# Patient Record
Sex: MALE | Race: WHITE | NOT HISPANIC OR LATINO | URBAN - METROPOLITAN AREA
[De-identification: names, ages, dates, MRNs, and addresses within clinical notes are randomized per-mention and may not be internally consistent; named-entity substitution may affect disease eponyms.]

---

## 2023-01-27 ENCOUNTER — EMERGENCY (EMERGENCY)
Facility: HOSPITAL | Age: 48
LOS: 0 days | Discharge: HOME | End: 2023-01-28
Attending: STUDENT IN AN ORGANIZED HEALTH CARE EDUCATION/TRAINING PROGRAM
Payer: OTHER MISCELLANEOUS

## 2023-01-27 VITALS
DIASTOLIC BLOOD PRESSURE: 114 MMHG | OXYGEN SATURATION: 99 % | HEART RATE: 101 BPM | RESPIRATION RATE: 18 BRPM | TEMPERATURE: 98 F | SYSTOLIC BLOOD PRESSURE: 182 MMHG

## 2023-01-27 DIAGNOSIS — R07.81 PLEURODYNIA: ICD-10-CM

## 2023-01-27 DIAGNOSIS — R21 RASH AND OTHER NONSPECIFIC SKIN ERUPTION: ICD-10-CM
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
## 2023-01-27 DIAGNOSIS — R06.2 WHEEZING: ICD-10-CM

## 2023-01-27 DIAGNOSIS — S22.41XA MULTIPLE FRACTURES OF RIBS, RIGHT SIDE, INITIAL ENCOUNTER FOR CLOSED FRACTURE: ICD-10-CM

## 2023-01-27 DIAGNOSIS — T44.7X5A ADVERSE EFFECT OF BETA-ADRENORECEPTOR ANTAGONISTS, INITIAL ENCOUNTER: ICD-10-CM
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
## 2023-01-27 PROCEDURE — 99284 EMERGENCY DEPT VISIT MOD MDM: CPT

## 2023-01-27 PROCEDURE — 93010 ELECTROCARDIOGRAM REPORT: CPT

## 2023-01-27 RX ORDER — DIPHENHYDRAMINE HCL 50 MG
50 CAPSULE ORAL ONCE
Refills: 0 | Status: DISCONTINUED | OUTPATIENT
Start: 2023-01-27 | End: 2023-01-28

## 2023-01-27 RX ORDER — FAMOTIDINE 10 MG/ML
20 INJECTION INTRAVENOUS ONCE
Refills: 0 | Status: COMPLETED | OUTPATIENT
Start: 2023-01-27 | End: 2023-01-27

## 2023-01-27 RX ORDER — EPINEPHRINE 0.3 MG/.3ML
0.3 INJECTION INTRAMUSCULAR; SUBCUTANEOUS ONCE
Refills: 0 | Status: COMPLETED | OUTPATIENT
Start: 2023-01-27 | End: 2023-01-27

## 2023-01-27 RX ORDER — AMLODIPINE BESYLATE 2.5 MG/1
10 TABLET ORAL ONCE
Refills: 0 | Status: DISCONTINUED | OUTPATIENT
Start: 2023-01-27 | End: 2023-01-27

## 2023-01-27 RX ORDER — METOPROLOL TARTRATE 50 MG
5 TABLET ORAL ONCE
Refills: 0 | Status: COMPLETED | OUTPATIENT
Start: 2023-01-27 | End: 2023-01-27

## 2023-01-27 RX ORDER — KETOROLAC TROMETHAMINE 30 MG/ML
30 SYRINGE (ML) INJECTION ONCE
Refills: 0 | Status: DISCONTINUED | OUTPATIENT
Start: 2023-01-27 | End: 2023-01-27

## 2023-01-27 RX ORDER — AMLODIPINE BESYLATE 2.5 MG/1
10 TABLET ORAL ONCE
Refills: 0 | Status: COMPLETED | OUTPATIENT
Start: 2023-01-27 | End: 2023-01-27

## 2023-01-27 RX ADMIN — EPINEPHRINE 0.3 MILLIGRAM(S): 0.3 INJECTION INTRAMUSCULAR; SUBCUTANEOUS at 23:23

## 2023-01-27 RX ADMIN — FAMOTIDINE 20 MILLIGRAM(S): 10 INJECTION INTRAVENOUS at 23:19

## 2023-01-27 RX ADMIN — Medication 125 MILLIGRAM(S): at 23:19

## 2023-01-27 RX ADMIN — Medication 25 MILLIGRAM(S): at 23:25

## 2023-01-27 RX ADMIN — Medication 30 MILLIGRAM(S): at 23:40

## 2023-01-27 RX ADMIN — Medication 30 MILLIGRAM(S): at 23:10

## 2023-01-27 RX ADMIN — Medication 5 MILLIGRAM(S): at 22:51

## 2023-01-27 RX ADMIN — AMLODIPINE BESYLATE 10 MILLIGRAM(S): 2.5 TABLET ORAL at 21:40

## 2023-01-27 NOTE — ED PROVIDER NOTE - OBJECTIVE STATEMENT
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

## 2023-01-27 NOTE — ED ADULT NURSE REASSESSMENT NOTE - NS ED NURSE REASSESS COMMENT FT1
Pt began to experience itching, some hives, and saying his throat feels funny. Dr Pak at bedside to evaluate - wheezing heard upon initial auscultation. Patient put on cardiac monitor, medicated per MAR orders. No wheezing heard at this time. Patient states he is feeling better.

## 2023-01-27 NOTE — ED PROVIDER NOTE - CARE PLAN
Principal Discharge DX:	Rib fracture   1 Principal Discharge DX:	Rib fracture  Secondary Diagnosis:	Allergic reaction

## 2023-01-27 NOTE — ED ADULT NURSE NOTE - OBJECTIVE STATEMENT
Pt AAOx4. 47 year old male presents to Ed with complaint of right rib pain that occurred when he was at work and fell off a ladder from 6 feet onto his right side. Per pt went to [facility] and had X-ray and was told there is no fracture. Patient then went to get a CT done and CT results showed a right 5-6 fracture, so he wanted to get another CT here. Denies chest pain, shortness of breath, nausea/vomiting, abdominal pain. Full ROM. Respirations equal and unlabored. No acute distress noted at this time.

## 2023-01-27 NOTE — ED PROVIDER NOTE - MUSCULOSKELETAL, MLM
Spine appears normal, range of motion is not limited, no muscle tenderness, +right lateral rib tenderness with palpation and deep breathing

## 2023-01-27 NOTE — ED PROVIDER NOTE - PATIENT PORTAL LINK FT
You can access the FollowMyHealth Patient Portal offered by [facility] by registering at the following website: [email or web address]. By joining [facility]’s FollowMyHealth portal, you will also be able to view your health information using other applications (apps) compatible with our system.

## 2023-01-27 NOTE — ED PROVIDER NOTE - PROGRESS NOTE DETAILS
pts bp continues to be high, iv lock placed, lopressor given iv push pt given lopressor and toradol for pain, developed rash and itchin,g medicated for allergy, audible wheezing, epi im ordered pt feels better after epi

## 2023-01-27 NOTE — ED PROVIDER NOTE - CLINICAL SUMMARY MEDICAL DECISION MAKING FREE TEXT BOX
pt presented today c/o right rib pain after falling off a ladder while at work, pt was initially seen at an urgent care center, had an xray and told he had no fracture yesterday, today went back due to severe pain, sent for ct and told he had two right rib nondisplaced fracture (5-6th ribs), pt came in concerned due to conflicting results and pain, pt reassured, given percocet for pain and given a prescription x 2 days, pt's elevated bp likely due to his 9/10, pt does have follow up with a thoracic specialist with workers comp pt presented today c/o right rib pain after falling off a ladder while at work, pt was initially seen at an urgent care center, had an xray and told he had no fracture yesterday, today went back due to severe pain, sent for ct and told he had two right rib nondisplaced fracture (5-6th ribs), pt came in concerned due to conflicting results and pain, pt reassured, given percocet for pain and given a prescription x 2 days, pt's elevated bp likely due to his 9/10, pt does have follow up with a thoracic specialist with workers comp    pt developed allergic reaction after receiving lopressor and toradol, pt has tolerated motrin at home, likely reaction due to lopressor, pt medicated and observated with improvement

## 2023-01-27 NOTE — ED ADULT TRIAGE NOTE - CHIEF COMPLAINT QUOTE
pt states " I fell from the attic and landed on my right side on the wooden floor yesterday.  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 no head injury. pt c/o right side rib pain. bp at  triage is 182/118, denies history.

## 2023-01-28 ENCOUNTER — EMERGENCY (EMERGENCY)
Facility: HOSPITAL | Age: 48
LOS: 0 days | Discharge: HOME | End: 2023-01-28
Attending: EMERGENCY MEDICINE
Payer: SELF-PAY

## 2023-01-28 VITALS
TEMPERATURE: 98 F | OXYGEN SATURATION: 95 % | SYSTOLIC BLOOD PRESSURE: 154 MMHG | RESPIRATION RATE: 19 BRPM | DIASTOLIC BLOOD PRESSURE: 94 MMHG | HEART RATE: 100 BPM

## 2023-01-28 VITALS
HEART RATE: 75 BPM | DIASTOLIC BLOOD PRESSURE: 74 MMHG | OXYGEN SATURATION: 97 % | TEMPERATURE: 98 F | SYSTOLIC BLOOD PRESSURE: 142 MMHG | RESPIRATION RATE: 18 BRPM

## 2023-01-28 VITALS
DIASTOLIC BLOOD PRESSURE: 111 MMHG | HEART RATE: 98 BPM | WEIGHT: 195.11 LBS | TEMPERATURE: 98 F | RESPIRATION RATE: 22 BRPM | HEIGHT: 69 IN | SYSTOLIC BLOOD PRESSURE: 176 MMHG

## 2023-01-28 DIAGNOSIS — R51.9 HEADACHE, UNSPECIFIED: ICD-10-CM

## 2023-01-28 DIAGNOSIS — S22.41XA MULTIPLE FRACTURES OF RIBS, RIGHT SIDE, INITIAL ENCOUNTER FOR CLOSED FRACTURE: ICD-10-CM
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
## 2023-01-28 DIAGNOSIS — G43.909 MIGRAINE, UNSPECIFIED, NOT INTRACTABLE, WITHOUT STATUS MIGRAINOSUS: ICD-10-CM
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
## 2023-01-28 LAB
BASOPHILS # BLD AUTO: 0 K/UL — SIGNIFICANT CHANGE UP (ref 0–0.2)
BASOPHILS NFR BLD AUTO: 0 % — SIGNIFICANT CHANGE UP (ref 0–2)
EOSINOPHIL # BLD AUTO: 0 K/UL — SIGNIFICANT CHANGE UP (ref 0–0.5)
EOSINOPHIL NFR BLD AUTO: 0 % — SIGNIFICANT CHANGE UP (ref 0–6)
HCT VFR BLD CALC: 42.5 % — SIGNIFICANT CHANGE UP (ref 39–50)
HGB BLD-MCNC: 13.9 G/DL — SIGNIFICANT CHANGE UP (ref 13–17)
LYMPHOCYTES # BLD AUTO: 0.38 K/UL — LOW (ref 1–3.3)
LYMPHOCYTES # BLD AUTO: 9 % — LOW (ref 13–44)
MANUAL SMEAR VERIFICATION: SIGNIFICANT CHANGE UP
MCHC RBC-ENTMCNC: 30.4 PG — SIGNIFICANT CHANGE UP (ref 27–34)
MCHC RBC-ENTMCNC: 32.7 G/DL — SIGNIFICANT CHANGE UP (ref 32–36)
MCV RBC AUTO: 93 FL — SIGNIFICANT CHANGE UP (ref 80–100)
MONOCYTES # BLD AUTO: 0.04 K/UL — SIGNIFICANT CHANGE UP (ref 0–0.9)
MONOCYTES NFR BLD AUTO: 1 % — LOW (ref 2–14)
NEUTROPHILS # BLD AUTO: 3.81 K/UL — SIGNIFICANT CHANGE UP (ref 1.8–7.4)
NEUTROPHILS NFR BLD AUTO: 90 % — HIGH (ref 43–77)
NRBC # BLD: 0 /100 — SIGNIFICANT CHANGE UP (ref 0–0)
NRBC # BLD: SIGNIFICANT CHANGE UP /100 WBCS (ref 0–0)
PLAT MORPH BLD: NORMAL — SIGNIFICANT CHANGE UP
PLATELET # BLD AUTO: 253 K/UL — SIGNIFICANT CHANGE UP (ref 150–400)
RBC # BLD: 4.57 M/UL — SIGNIFICANT CHANGE UP (ref 4.2–5.8)
RBC # FLD: 12.9 % — SIGNIFICANT CHANGE UP (ref 10.3–14.5)
RBC BLD AUTO: NORMAL — SIGNIFICANT CHANGE UP
WBC # BLD: 4.23 K/UL — SIGNIFICANT CHANGE UP (ref 3.8–10.5)
WBC # FLD AUTO: 4.23 K/UL — SIGNIFICANT CHANGE UP (ref 3.8–10.5)

## 2023-01-28 PROCEDURE — 70450 CT HEAD/BRAIN W/O DYE: CPT | Mod: 26,MA

## 2023-01-28 PROCEDURE — 72125 CT NECK SPINE W/O DYE: CPT | Mod: 26,MA

## 2023-01-28 PROCEDURE — 99284 EMERGENCY DEPT VISIT MOD MDM: CPT

## 2023-01-28 PROCEDURE — 93010 ELECTROCARDIOGRAM REPORT: CPT

## 2023-01-28 RX ORDER — DIPHENHYDRAMINE HCL 50 MG
25 CAPSULE ORAL ONCE
Refills: 0 | Status: COMPLETED | OUTPATIENT
Start: 2023-01-28 | End: 2023-01-27

## 2023-01-28 RX ORDER — EPINEPHRINE 0.3 MG/.3ML
0.3 INJECTION INTRAMUSCULAR; SUBCUTANEOUS
Qty: 1 | Refills: 0
Start: 2023-01-28

## 2023-01-28 RX ORDER — DIPHENHYDRAMINE HCL 50 MG
2 CAPSULE ORAL
Qty: 40 | Refills: 0
Start: 2023-01-28 | End: 2023-02-01

## 2023-01-28 RX ORDER — FAMOTIDINE 10 MG/ML
1 INJECTION INTRAVENOUS
Qty: 10 | Refills: 0
Start: 2023-01-28 | End: 2023-02-01

## 2023-01-28 RX ORDER — OXYCODONE AND ACETAMINOPHEN 5; 325 MG/1; MG/1
1 TABLET ORAL
Qty: 12 | Refills: 0
Start: 2023-01-28 | End: 2023-01-30

## 2023-01-28 RX ORDER — KETOROLAC TROMETHAMINE 30 MG/ML
30 SYRINGE (ML) INJECTION ONCE
Refills: 0 | Status: DISCONTINUED | OUTPATIENT
Start: 2023-01-28 | End: 2023-01-28

## 2023-01-28 RX ORDER — ACETAMINOPHEN 500 MG
1000 TABLET ORAL ONCE
Refills: 0 | Status: COMPLETED | OUTPATIENT
Start: 2023-01-28 | End: 2023-01-28

## 2023-01-28 RX ORDER — DIPHENHYDRAMINE HCL 50 MG
25 CAPSULE ORAL ONCE
Refills: 0 | Status: COMPLETED | OUTPATIENT
Start: 2023-01-28 | End: 2023-01-28

## 2023-01-28 RX ORDER — METOCLOPRAMIDE HCL 10 MG
10 TABLET ORAL ONCE
Refills: 0 | Status: COMPLETED | OUTPATIENT
Start: 2023-01-28 | End: 2023-01-28

## 2023-01-28 RX ADMIN — Medication 400 MILLIGRAM(S): at 13:19

## 2023-01-28 RX ADMIN — Medication 25 MILLIGRAM(S): at 13:19

## 2023-01-28 RX ADMIN — Medication 10 MILLIGRAM(S): at 13:19

## 2023-01-28 NOTE — ED PROVIDER NOTE - OBJECTIVE STATEMENT
47 year old male without significant PMH other than migraines- was seen yesterday at Urgent care after a fall noted to have a rib fracture on R 5th and 6th rib and was seen in ED and discharged overnight - pt now complaining of headache, nausea without vomiting and without any other focal neuro deficit, no weakness or numbness noted.

## 2023-01-28 NOTE — ED PROVIDER NOTE - PROVIDER TOKENS
PROVIDER:[TOKEN:[[id number]],FOLLOWUP:[1-3 Days]],PROVIDER:[TOKEN:[[id number]],FOLLOWUP:[1-3 Days]]

## 2023-01-28 NOTE — ED PROVIDER NOTE - NSFOLLOWUPINSTRUCTIONS_ED_ALL_ED_FT
Migraine Headache      A migraine headache is an intense, throbbing pain on one side or both sides of the head. Migraine headaches may also cause other symptoms, such as nausea, vomiting, and sensitivity to light and noise. A migraine headache can last from 4 hours to 3 days. Talk with your doctor about what things may bring on (trigger) your migraine headaches.      What are the causes?    The exact cause of this condition is not known. However, a migraine may be caused when nerves in the brain become irritated and release chemicals that cause inflammation of blood vessels. This inflammation causes pain. This condition may be triggered or caused by:  •Drinking alcohol.      •Smoking.    •Taking medicines, such as:  •Medicine used to treat chest pain (nitroglycerin).      •Birth control pills.      •Estrogen.      •Certain blood pressure medicines.        •Eating or drinking products that contain nitrates, glutamate, aspartame, or tyramine. Aged cheeses, chocolate, or caffeine may also be triggers.      •Doing physical activity.      Other things that may trigger a migraine headache include:  •Menstruation.      •Pregnancy.      •Hunger.      •Stress.      •Lack of sleep or too much sleep.      •Weather changes.      •Fatigue.        What increases the risk?    The following factors may make you more likely to experience migraine headaches:  •Being a certain age. This condition is more common in people who are 25–55 years old.      •Being female.      •Having a family history of migraine headaches.      •Being .      •Having a mental health condition, such as depression or anxiety.      •Being obese.        What are the signs or symptoms?    The main symptom of this condition is pulsating or throbbing pain. This pain may:  •Happen in any area of the head, such as on one side or both sides.      •Interfere with daily activities.      •Get worse with physical activity.      •Get worse with exposure to bright lights or loud noises.      Other symptoms may include:  •Nausea.      •Vomiting.      •Dizziness.      •General sensitivity to bright lights, loud noises, or smells.      Before you get a migraine headache, you may get warning signs (an aura). An aura may include:  •Seeing flashing lights or having blind spots.      •Seeing bright spots, halos, or zigzag lines.      •Having tunnel vision or blurred vision.      •Having numbness or a tingling feeling.      •Having trouble talking.      •Having muscle weakness.      Some people have symptoms after a migraine headache (postdromal phase), such as:  •Feeling tired.      •Difficulty concentrating.        How is this diagnosed?    A migraine headache can be diagnosed based on:  •Your symptoms.      •A physical exam.    •Tests, such as:   •CT scan or an MRI of the head. These imaging tests can help rule out other causes of headaches.      •Taking fluid from the spine (lumbar puncture) and analyzing it (cerebrospinal fluid analysis, or CSF analysis).          How is this treated?    This condition may be treated with medicines that:  •Relieve pain.      •Relieve nausea.      •Prevent migraine headaches.      Treatment for this condition may also include:  •Acupuncture.      •Lifestyle changes like avoiding foods that trigger migraine headaches.      •Biofeedback.      •Cognitive behavioral therapy.        Follow these instructions at home:    Medicines     •Take over-the-counter and prescription medicines only as told by your health care provider.    •Ask your health care provider if the medicine prescribed to you:  •Requires you to avoid driving or using heavy machinery.    •Can cause constipation. You may need to take these actions to prevent or treat constipation:  •Drink enough fluid to keep your urine pale yellow.      •Take over-the-counter or prescription medicines.      •Eat foods that are high in fiber, such as beans, whole grains, and fresh fruits and vegetables.      •Limit foods that are high in fat and processed sugars, such as fried or sweet foods.          Lifestyle     • Do not drink alcohol.      • Do not use any products that contain nicotine or tobacco, such as cigarettes, e-cigarettes, and chewing tobacco. If you need help quitting, ask your health care provider.      •Get at least 8 hours of sleep every night.      •Find ways to manage stress, such as meditation, deep breathing, or yoga.        General instructions                 •Keep a journal to find out what may trigger your migraine headaches. For example, write down:  •What you eat and drink.      •How much sleep you get.      •Any change to your diet or medicines.      •If you have a migraine headache:  •Avoid things that make your symptoms worse, such as bright lights.      •It may help to lie down in a dark, quiet room.      •Do not drive or use heavy machinery.      •Ask your health care provider what activities are safe for you while you are experiencing symptoms.        •Keep all follow-up visits as told by your health care provider. This is important.        Contact a health care provider if:    •You develop symptoms that are different or more severe than your usual migraine headache symptoms.      •You have more than 15 headache days in one month.        Get help right away if:    •Your migraine headache becomes severe.      •Your migraine headache lasts longer than 72 hours.      •You have a fever.      •You have a stiff neck.      •You have vision loss.      •Your muscles feel weak or like you cannot control them.      •You start to lose your balance often.      •You have trouble walking.      •You faint.      •You have a seizure.        Summary    •A migraine headache is an intense, throbbing pain on one side or both sides of the head. Migraines may also cause other symptoms, such as nausea, vomiting, and sensitivity to light and noise.      •This condition may be treated with medicines and lifestyle changes. You may also need to avoid certain things that trigger a migraine headache.      •Keep a journal to find out what may trigger your migraine headaches.      •Contact your health care provider if you have more than 15 headache days in a month or you develop symptoms that are different or more severe than your usual migraine headache symptoms.      This information is not intended to replace advice given to you by your health care provider. Make sure you discuss any questions you have with your health care provider.        Concussion, Adult    A series of images showing how the quick head movements of a concussion injure the brain.   A concussion is a brain injury from a hard, direct hit (trauma) to the head or body. This direct hit causes the brain to shake quickly back and forth inside the skull. This can damage brain cells and cause chemical changes in the brain. A concussion may also be known as a mild traumatic brain injury (TBI).    Concussions are usually not life-threatening, but the effects of a concussion can be serious. If you have a concussion, you should be very careful to avoid having a second concussion.      What are the causes?    This condition is caused by:•A direct hit to your head, such as:  •Running into another player during a game.      •Being hit in a fight.      •Hitting your head on a hard surface.        •Sudden movement of your body that causes your brain to move back and forth inside the skull, such as in a car crash.        What are the signs or symptoms?    The signs of a concussion can be hard to notice. Early on, they may be missed by you, family members, and health care providers. You may look fine on the outside but may act or feel differently.    Every head injury is different. Symptoms are usually temporary but may last for days, weeks, or even months. Some symptoms appear right away, but other symptoms may not show up for hours or days. If your symptoms last longer than normal, you may have post-concussion syndrome.    Physical symptoms     •Headaches.      •Dizziness and problems with coordination or balance.      •Sensitivity to light or noise.      •Nausea or vomiting.      •Tiredness (fatigue).      •Vision or hearing problems.      •Changes in eating or sleeping patterns.      •Seizure.      Mental and emotional symptoms     •Irritability or mood changes.      •Memory problems.      •Trouble concentrating, organizing, or making decisions.      •Slowness in thinking, acting or reacting, speaking, or reading.      •Anxiety or depression.        How is this diagnosed?    This condition is diagnosed based on:  •Your symptoms.      •A description of your injury.      You may also have tests, including:  •Imaging tests, such as a CT scan or an MRI.      •Neuropsychological tests. These measure your thinking, understanding, learning, and remembering abilities.        How is this treated?    Treatment for this condition includes:•Stopping sports or activity if you are injured. If you hit your head or show signs of concussion:   •Do not return to sports or activities the same day.       •Get checked by a health care provider before you return to your activities.        •Physical and mental rest and careful observation, usually at home. Gradually return to your normal activities.    •Medicines to help with symptoms such as headaches, nausea, or difficulty sleeping.  •Avoid taking opioid pain medicine while recovering from a concussion.        •Avoiding alcohol and drugs. These may slow your recovery and can put you at risk of further injury.      •Referral to a concussion clinic or rehabilitation center.      Recovery from a concussion can take time. How fast you recover depends on many factors. Return to activities only when:  •Your symptoms are completely gone.      •Your health care provider says that it is safe.        Follow these instructions at home:    Activity   •Limit activities that require a lot of thought or concentration, such as:  •Doing homework or job-related work.      •Watching TV.      •Working on the computer or phone.      •Playing memory games and puzzles.      •Rest. Rest helps your brain heal. Make sure you:  •Get plenty of sleep. Most adults should get 7–9 hours of sleep each night.      •Rest during the day. Take naps or rest breaks when you feel tired.        •Avoid physical activity like exercise until your health care provider says it is safe. Stop any activity that worsens symptoms.      • Do not do high-risk activities that could cause a second concussion, such as riding a bike or playing sports.      •Ask your health care provider when you can return to your normal activities, such as school, work, athletics, and driving. Your ability to react may be slower after a brain injury. Never do these activities if you are dizzy. Your health care provider will likely give you a plan for gradually returning to activities.        General instructions   A bottle of beer, a glass of wine, and a glass of hard liquor with a "do not drink" sign over them.    •Take over-the-counter and prescription medicines only as told by your health care provider. Some medicines, such as blood thinners (anticoagulants) and aspirin, may increase the risk for complications, such as bleeding.      • Do not drink alcohol until your health care provider says you can.      •Watch your symptoms and tell others around you to do the same. Complications sometimes occur after a concussion. Older adults with a brain injury may have a higher risk of serious complications.      •Tell your , teachers, school nurse, school counselor, , or  about your injury, symptoms, and restrictions.      •Keep all follow-up visits as told by your health care provider. This is important.        How is this prevented?    Avoiding another brain injury is very important. In rare cases, another injury can lead to permanent brain damage, brain swelling, or death. The risk of this is greatest during the first 7–10 days after a head injury. Avoid injuries by:  •Stopping activities that could lead to a second concussion, such as contact or recreational sports, until your health care provider says it is okay.    •Taking these actions once you have returned to sports or activities:  •Avoiding plays or moves that can cause you to crash into another person. This is how most concussions occur.      •Following the rules and being respectful of other players. Do not engage in violent or illegal plays.        •Getting regular exercise that includes strength and balance training.      •Wearing a properly fitting helmet during sports, biking, or other activities. Helmets can help protect you from serious skull and brain injuries, but they may not protect you from a concussion. Even when wearing a helmet, you should avoid being hit in the head.        Contact a health care provider if:    •Your symptoms do not improve.      •You have new symptoms.      •You have another injury.        Get help right away if:  •You have new or worsening physical symptoms, such as:  •A severe or worsening headache.      •Weakness or numbness in any part of your body, slurred speech, vision changes, or confusion.      •Your coordination gets worse.      •Vomiting repeatedly.      •You have a seizure.      •You have unusual behavior changes.      •You lose consciousness, are sleepier than normal, or are difficult to wake up.        These symptoms may represent a serious problem that is an emergency. Do not wait to see if the symptoms will go away. Get medical help right away. Call your local emergency services (911 in the [place] Do not drive yourself to the hospital.       Summary    •A concussion is a brain injury that results from a hard, direct hit (trauma) to your head or body.      •You may have imaging tests and neuropsychological tests to diagnose a concussion.      •Treatment for this condition includes physical and mental rest and careful observation.      •Ask your health care provider when you can return to your normal activities, such as school, work, athletics, and driving.       •Get help right away if you have a severe headache, weakness in any part of the body, seizures, behavior changes, changes in vision, or if you are confused or sleepier than normal.      This information is not intended to replace advice given to you by your health care provider. Make sure you discuss any questions you have with your health care provider.

## 2023-01-28 NOTE — ED ADULT TRIAGE NOTE - CHIEF COMPLAINT QUOTE
Pt recently discharge this AM return to ED with complaints of shakiness, dizziness, and headache. Vomited 2 times and feels nauseous.

## 2023-01-28 NOTE — ED PROVIDER NOTE - CARE PROVIDER_API CALL
Darwin Hay (DO)  Orthopaedic Surgery Surgery  [address], [address]  [place] [address]  Phone: [phone]  Fax: [phone]  Follow Up Time: 1-3 Days    Cecelia Duomnt  NEUROLOGY  [address] [place] [address]  Phone: [phone]  Fax: [phone]  Follow Up Time: 1-3 Days

## 2023-01-28 NOTE — ED PROVIDER NOTE - CLINICAL SUMMARY MEDICAL DECISION MAKING FREE TEXT BOX
pt states headache is similar to previous migraine headaches he had in past - due to lack of sleep but differential included concussive headacheand otherwise  CT head without acute bleed or findings - CT cervical spine noted disc herniations but no focal neuro deficits on BUE on exam - will dc with spine follow up for any issues that may develop given recent fall.

## 2023-01-28 NOTE — ED PROVIDER NOTE - MUSCULOSKELETAL, MLM
Spine appears normal, range of motion is not limited, R 5th and 6th rib tender without crepitus to chest noted

## 2023-01-28 NOTE — ED PROVIDER NOTE - PATIENT PORTAL LINK FT
You can access the FollowMyHealth Patient Portal offered by [facility] by registering at the following website: [email or web address]. By joining [facility]’s FollowMyHealth portal, you will also be able to view your health information using other applications (apps) compatible with our system.